# Patient Record
Sex: MALE | Race: WHITE | Employment: FULL TIME | URBAN - METROPOLITAN AREA
[De-identification: names, ages, dates, MRNs, and addresses within clinical notes are randomized per-mention and may not be internally consistent; named-entity substitution may affect disease eponyms.]

---

## 2017-05-10 RX ORDER — ATORVASTATIN CALCIUM 10 MG/1
TABLET, FILM COATED ORAL
Qty: 90 TAB | Refills: 0 | Status: SHIPPED | OUTPATIENT
Start: 2017-05-10 | End: 2017-08-08 | Stop reason: SDUPTHER

## 2017-08-08 RX ORDER — ATORVASTATIN CALCIUM 10 MG/1
TABLET, FILM COATED ORAL
Qty: 90 TAB | Refills: 0 | Status: SHIPPED | OUTPATIENT
Start: 2017-08-08 | End: 2018-07-12 | Stop reason: SDUPTHER

## 2018-07-12 ENCOUNTER — OFFICE VISIT (OUTPATIENT)
Dept: INTERNAL MEDICINE CLINIC | Age: 53
End: 2018-07-12

## 2018-07-12 VITALS
WEIGHT: 289 LBS | HEART RATE: 86 BPM | SYSTOLIC BLOOD PRESSURE: 142 MMHG | HEIGHT: 74 IN | RESPIRATION RATE: 14 BRPM | OXYGEN SATURATION: 97 % | DIASTOLIC BLOOD PRESSURE: 86 MMHG | BODY MASS INDEX: 37.09 KG/M2 | TEMPERATURE: 98.4 F

## 2018-07-12 DIAGNOSIS — J30.89 NON-SEASONAL ALLERGIC RHINITIS, UNSPECIFIED TRIGGER: ICD-10-CM

## 2018-07-12 DIAGNOSIS — Z00.00 PHYSICAL EXAM: ICD-10-CM

## 2018-07-12 DIAGNOSIS — E78.5 HYPERLIPIDEMIA, UNSPECIFIED HYPERLIPIDEMIA TYPE: ICD-10-CM

## 2018-07-12 DIAGNOSIS — R03.0 ELEVATED BLOOD-PRESSURE READING WITHOUT DIAGNOSIS OF HYPERTENSION: ICD-10-CM

## 2018-07-12 DIAGNOSIS — E66.9 OBESITY (BMI 30-39.9): ICD-10-CM

## 2018-07-12 DIAGNOSIS — G47.33 OBSTRUCTIVE SLEEP APNEA SYNDROME: ICD-10-CM

## 2018-07-12 DIAGNOSIS — G47.00 INSOMNIA, UNSPECIFIED TYPE: ICD-10-CM

## 2018-07-12 DIAGNOSIS — K57.90 DIVERTICULOSIS OF INTESTINE WITHOUT BLEEDING, UNSPECIFIED INTESTINAL TRACT LOCATION: ICD-10-CM

## 2018-07-12 DIAGNOSIS — R73.01 IFG (IMPAIRED FASTING GLUCOSE): ICD-10-CM

## 2018-07-12 DIAGNOSIS — R73.03 PREDIABETES: Primary | ICD-10-CM

## 2018-07-12 DIAGNOSIS — E66.01 SEVERE OBESITY (BMI 35.0-39.9): ICD-10-CM

## 2018-07-12 RX ORDER — FLUTICASONE PROPIONATE 50 MCG
SPRAY, SUSPENSION (ML) NASAL
Qty: 1 BOTTLE | Refills: 11 | Status: SHIPPED | OUTPATIENT
Start: 2018-07-12

## 2018-07-12 RX ORDER — ZOLPIDEM TARTRATE 10 MG/1
TABLET ORAL
Qty: 30 TAB | Refills: 5 | OUTPATIENT
Start: 2018-07-12

## 2018-07-12 RX ORDER — ATORVASTATIN CALCIUM 10 MG/1
10 TABLET, FILM COATED ORAL DAILY
Qty: 90 TAB | Refills: 1 | Status: SHIPPED | OUTPATIENT
Start: 2018-07-12 | End: 2019-01-16 | Stop reason: SDUPTHER

## 2018-07-12 NOTE — PROGRESS NOTES
1. Have you been to the ER, urgent care clinic or hospitalized since your last visit? YES. Urgent Care in May for cough    2. Have you seen or consulted any other health care providers outside of the [address] [place] [address] since your last visit (Include any pap smears or colon screening)? NO      Do you have an Advanced Directive? NO    Would you like information on Advanced Directives?  NO    Chief Complaint   Patient presents with    Physical

## 2018-07-12 NOTE — MR AVS SNAPSHOT
[facility] 
 
 
 [address], [address] [facility] 
[phone] Patient: Lexie Calvo MRN: [id number] BU:[id number] Visit Information Date & Time Provider Department Dept. Phone Encounter #  
 7/12/2018  2:00 PM Tala Uribe MD [facility] [id number] Your Appointments 1/16/2019 10:20 AM  
Office Visit with Tala Uribe MD  
[facility]) Appt Note: 6 labs at [facility] [address] [facility], [facility] [address] [place]  
  
   
 [address] [facility], [facility] Upcoming Health Maintenance Date Due Hepatitis C Screening 1965 DTaP/Tdap/Td series (1 - Tdap) 8/2/1984 FOBT Q 1 YEAR AGE 50-75 8/2/2013 Influenza Age 5 to Adult 8/1/2018 Allergies as of 7/12/2018  Review Complete On: 7/12/2018 By: Jessa Rocha LPN Severity Noted Reaction Type Reactions Flonase [Fluticasone]   Not Verified Other (comments) Patient  Says this medication is nott an allergy. Current Immunizations  Reviewed on 6/17/2014 Name Date Influenza Vaccine 12/26/2012 Not reviewed this visit You Were Diagnosed With   
  
 Codes Comments Physical exam    -  Primary ICD-10-CM: Z00.00 ICD-9-CM: V70.9 Hyperlipidemia, unspecified hyperlipidemia type     ICD-10-CM: E78.5 ICD-9-CM: 272.4 Obstructive sleep apnea syndrome     ICD-10-CM: G47.33 
ICD-9-CM: 327.23 Obesity (BMI 30-39. 9)     ICD-10-CM: E66.9 ICD-9-CM: 278.00 Diverticulosis of intestine without bleeding, unspecified intestinal tract location     ICD-10-CM: K57.90 ICD-9-CM: 562.10 Prediabetes     ICD-10-CM: R73.03 
ICD-9-CM: 790.29 Vitals BP Pulse Temp Resp Height(growth percentile) Weight(growth percentile) 148/90 86 98.4 °F (36.9 °C) (Oral) 14 6' 2\" (1.88 m) 289 lb (131.1 kg) SpO2 BMI Smoking Status 97% 37.11 kg/m2 Never Smoker Vitals History BMI and BSA Data Body Mass Index Body Surface Area  
 37.11 kg/m 2 2.62 m 2 Preferred Pharmacy Pharmacy Name Phone [id number], [facility] [facility] [phone] Your Updated Medication List  
  
   
This list is accurate as of 7/12/18  3:23 PM.  Always use your most recent med list.  
  
  
  
  
 atorvastatin 10 mg tablet Commonly known as:  LIPITOR  
TAKE 1 TABLET DAILY FISH OIL 1,000 mg Cap Generic drug:  omega-3 fatty acids-vitamin e Take 1 Cap by mouth. fluticasone 50 mcg/actuation nasal spray Commonly known as:  FLONASE  
2 sprays each nostril daily  
  
 zolpidem 10 mg tablet Commonly known as:  AMBIEN  
take 1 tablet by mouth at bedtime if needed for sleep ZyrTEC 10 mg tablet Generic drug:  cetirizine Take  by mouth daily. To-Do List   
 01/09/2019 Lab:  CBC W/O DIFF   
  
 01/09/2019 Lab:  LIPID PANEL   
  
 01/09/2019 Lab:  METABOLIC PANEL, COMPREHENSIVE   
  
 01/09/2019 Lab:  PSA, DIAGNOSTIC (PROSTATE SPECIFIC AG)   
  
 01/11/2019 Lab:  HEMOGLOBIN A1C W/O EAG Introducing [facility]! Dear Kwasi Ahmadi: 
Thank you for requesting a [employer] account. Our records indicate that you already have an active [employer] account. You can access your account anytime at [email or web address] Did you know that you can access your hospital and ER discharge instructions at any time in [employer]? You can also review all of your test results from your hospital stay or ER visit. Additional Information If you have questions, please visit the Frequently Asked Questions section of the [employer] website at [email or web address] Remember, [employer] is NOT to be used for urgent needs. For medical emergencies, dial 911. Now available from your iPhone and Android! Please provide this summary of care documentation to your next provider. Your primary care clinician is listed as Brice Ordonez. If you have any questions after today's visit, please call [phone].

## 2018-07-13 ENCOUNTER — TELEPHONE (OUTPATIENT)
Dept: INTERNAL MEDICINE CLINIC | Age: 53
End: 2018-07-13

## 2018-07-13 NOTE — TELEPHONE ENCOUNTER
The 4 scripts that were sent to [facility] yesterday need to go to the [facility] that was updated- [facility]

## 2018-07-13 NOTE — PROGRESS NOTES
45 y.o. WHITE OR  male who presents for RPE    We've not seen him in almost 3 years. He 'just got too busy with his business' and didn't have time to come in    Reports no cardiovascular complaints. He is active doing physical work although no set exercise, works from [address] to 7p or even later. He has not been following the bp     He has been having a cough since Jan.  Actually went to urgent care late Apr and reports cxr was neg then. He is not using antihistamines but the flonase helped him in the past.    Diet could be better. Reports that he eats fruit at 6a, then lunch at 2p then late dinner 7-8p.    Interested in starting back the qsymia as it helped suppress the appetite when he was taking it    Vitals 7/12/2018 12/1/2016 8/10/2015 4/22/2015 10/29/2014   Weight 289 lb 279 lb 272 lb 265 lb 244 lb 12.8 oz     No gi or gu complaints    IF 7/18    Past Medical History:   Diagnosis Date    Allergic rhinitis     Diverticulosis 2014     Dr Stiven Dejesus on US 2012     Fib-4 was 1.05 from 4/13    IOANA (generalized anxiety disorder)     Hyperlipidemia     IFG (impaired fasting glucose) 12/12    Insomnia     Obesity     peak weight 274 lbs, bmi 35.2 from 2/14; Dr Julianne Tirado 2/14; qsymia 2015; IF 7/18    Scoliosis     Seasonal allergies     Sleep apnea Dr. 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  zolpidem (AMBIEN) 10 mg tablet take 1 tablet by mouth at bedtime if needed for sleep    phentermine-topiramate (QSYMIA) 15-92 mg CM24 Take 1 Tab by mouth daily. Max Daily Amount: 1 Tab.  fluticasone (FLONASE) 50 mcg/actuation nasal spray 2 sprays each nostril daily    cetirizine (ZYRTEC) 10 mg tablet Take  by mouth daily.  omega-3 fatty acids-vitamin e (FISH OIL) 1,000 mg cap Take 1 Cap by mouth. No current facility-administered medications for this visit. No Active Allergies     REVIEW OF SYSTEMS: colo 4/14 Dr Nadine Snow no vision change or eye pain  Oral  no mouth pain, tongue or tooth problems  Ears  no hearing loss, ear pain, fullness, no swallowing problems  Cardiac  no CP, PND, orthopnea, edema, palpitations or syncope  Chest  no breast masses  GI  no heartburn, nausea, vomiting, change in bowel habits, bleeding, hemorrhoids  Urinary  no dysuria, hematuria, flank pain, urgency, frequency  Constitutional  no wt loss, night sweats, unexplained fevers  Neuro  no focal weakness, numbness, paresthesias, incoordination, ataxia, involuntary movements  Endo - no polyuria, polydipsia, nocturia, hot flashes    Visit Vitals    /86    Pulse 86    Temp 98.4 °F (36.9 °C) (Oral)    Resp 14    Ht 6' 2\" (1.88 m)    Wt 289 lb (131.1 kg)    SpO2 97%    BMI 37.11 kg/m2     Affect is appropriate.   Mood stable  No apparent distress  HEENT --Anicteric sclerae, tympanic membranes normal,  ear canals normal.  PERRL, EOMI, conjunctiva and lids normal.  Disks were sharp  Sinuses were nontender, turbinates normal, hearing normal.  Oropharynx without  erythema, normal tongue, oral mucosa and tonsils. No thyromegaly, JVD, or bruits. Lungs --Clear to auscultation, normal percussion. Heart --Regular rate and rhythm, no murmurs, rubs, gallops, or clicks. Chest wall --Nontender to palpation. PMI normal.  Abdomen -- Soft and nontender, no hepatosplenomegaly or masses. Prostate  -- no asymmetry, nodularity, tenderness or enlargement  Rectal  -- normal tone, guiaiac negative brown stool  Extremities -- Without cyanosis, clubbing, edema. 2+ pulses equally and bilaterally. LABS  From 12/11 showed gluc 104, cr 1.02, gfr 87,  alt 40,                   ldl-p 2346, chol 205, tg 185, hdl 29, ldl-c 139  From 12/12 showed gluc 95,   cr 1.03, gfr 85,  alt 59, hba1c 5.9, ldl-p 2785, chol 194, tg 165, hdl 30, ldl-c 131  From 4/13 showed   gluc 95,   cr 1.01, gfr 87,  alt 36, hba1c 6.0,                   chol 113, tg 138, hdl 24, ldl-c 61, wbc 7.7, hb 16.4, plt 187, test 466  From 2/14 showed   gluc 97,   cr 1.02, gfr 85,  alt 56, hba1c 6.1, ldl-p 1539, chol 121, tg 118, hdl 27, ldl-c 70  From 6/14 showed   gluc 90,   cr 1.00,     alt 34, hba1c 5.6, ldl-p 973,   chol 108, tg 126, hdl 26, ldl-c 71, vit d 41,                hscrp 2.2, lpa 52, uric 6. From 8/15 showed   gluc 107, cr 1.10, gfr>60, alt 49, hba1c 6.3,     chol 123, tg 113, hdl 26, ldl-c 74, wbc 7.0, hb 14.8, plt 193, ast 25, ap 56, tb 0.8    Patient Active Problem List   Diagnosis Code    Hyperlipidemia E78.5    Sleep apnea Dr. January Garcia 2013 AHI 66.3 G47.30    Diverticulosis 2014 Dr Rose Mary Sandra K57.90    Severe obesity (BMI 35.0-39.9) (HCC) E66.01    IFG (impaired fasting glucose) R73.01     Assessment and plan:  1. Dyslipidemia. Continue current regimen, check labs soon  2. IFG. Wt loss, lifestyle and dietary measures. Long discussion about typical progression of preDM. Wt loss would be ideal  3. Fatty liver. As above  4. JULIUS. F/U Dr. Sin Drivers, cont cpap  5. Diverticulosis. Fiber, colo 2024  6. Obesity.   Intermittent fasting discussed at length. Explained the concepts in detail. Went over possible physiologic changes that could occur and how that would possibly impact his situation in a positive way. Discussed 16:8 program in particular. We also went over the differences between hunger and damian hypoglycemia. Look up low insulin index foods. He will do some more research and consider implementing in the near future, standard handout given  7. Allergic rhinitis. flonase refilled, start zyrtec  8. Chronic cough. tx allergies, proceed w w/u if not improved after #7        RTC 11/18    Above conditions discussed at length and patient vocalized understanding.   All questions answered to patient satisfaction    TOTAL time 40 min spent of which at least 30 min was on counseling, answering questions and coordination of care

## 2018-07-16 ENCOUNTER — DOCUMENTATION ONLY (OUTPATIENT)
Dept: INTERNAL MEDICINE CLINIC | Age: 53
End: 2018-07-16

## 2018-07-16 NOTE — PROGRESS NOTES
PA for Qsymia denied. Insurance requires lost greater than or equal to 3 percent of baseline body weight since starting medication to be considered for continuation of therapy. Sent copy to pharmacy.

## 2019-01-16 ENCOUNTER — OFFICE VISIT (OUTPATIENT)
Dept: INTERNAL MEDICINE CLINIC | Age: 54
End: 2019-01-16

## 2019-01-16 VITALS
OXYGEN SATURATION: 98 % | TEMPERATURE: 98.7 F | SYSTOLIC BLOOD PRESSURE: 128 MMHG | RESPIRATION RATE: 14 BRPM | HEART RATE: 74 BPM | HEIGHT: 74 IN | WEIGHT: 282 LBS | BODY MASS INDEX: 36.19 KG/M2 | DIASTOLIC BLOOD PRESSURE: 82 MMHG

## 2019-01-16 DIAGNOSIS — E66.01 SEVERE OBESITY WITH BODY MASS INDEX (BMI) OF 35.0 TO 39.9 WITH SERIOUS COMORBIDITY (HCC): ICD-10-CM

## 2019-01-16 DIAGNOSIS — E78.5 HYPERLIPIDEMIA, UNSPECIFIED HYPERLIPIDEMIA TYPE: ICD-10-CM

## 2019-01-16 RX ORDER — ATORVASTATIN CALCIUM 10 MG/1
10 TABLET, FILM COATED ORAL DAILY
Qty: 90 TAB | Refills: 1 | Status: SHIPPED | OUTPATIENT
Start: 2019-01-16 | End: 2019-12-27

## 2019-01-16 NOTE — PROGRESS NOTES
52 y.o. WHITE OR  male who presents for f/u Reports no cardiovascular complaints. He is active doing physical work although no set exercise At then last visit, we introduced intermittent fasting but he has not been doing it. Alsoi, we gave him qsymia script but his pharmacist told him he could not get it anymore as 'he failed previous treatment' although he swears he's never taken it. We gave him script back in 2015 Vitals 1/16/2019 7/12/2018 12/1/2016 8/10/2015 4/22/2015 Weight 282 lb 289 lb 279 lb 272 lb 265 lb No gi or gu complaints IF 7/18 Past Medical History:  
Diagnosis Date  Allergic rhinitis  Diverticulosis 2014 Dr Tal Alanis  Fatty liver on US 2012 Fib-4 was 1.05 from 4/13  IOANA (generalized anxiety disorder)  Hyperlipidemia  IFG (impaired fasting glucose) 12/12  Insomnia  Obesity Dr Wesly Biggs 2/14; qsymia 2015; IF 7/18 start weight 289 lbs  Scoliosis  Seasonal allergies  Sleep apnea Dr. Kaitlin Rivera 2013 4/13 Dr Heaven Feliciano; AHI 66.4, min desats 70%  Urethral stricture 1995 Past Surgical History:  
Procedure Laterality Date [facility]  HX COLONOSCOPY    
 diverticulosis 4/14 Dr Tal Alanis  HX HEENT  1983  
 sinus surgery  HX REFRACTIVE SURGERY    
 HX TONSIL AND ADENOIDECTOMY  HX UROLOGICAL  2007  
 vasectomy [employer] Social History Socioeconomic History  Marital status:  Spouse name: Not on file  Number of children: 2  
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  
  Smoking status: Never Smoker  Smokeless tobacco: Never Used Substance and Sexual Activity  Alcohol use: Yes Comment: social  
 Drug use: No  
 Sexual activity: Not on file Other Topics Concern  Not on file Social History Narrative  Not on file Current Outpatient Medications Medication Sig  
 atorvastatin (LIPITOR) 10 mg tablet Take 1 Tab by mouth daily.  phentermine-topiramate (QSYMIA) 15-92 mg CM24 Take 1 Tab by mouth daily. Max Daily Amount: 1 Tab.  zolpidem (AMBIEN) 10 mg tablet take 1 tablet by mouth at bedtime if needed for sleep  cetirizine (ZYRTEC) 10 mg tablet Take  by mouth daily.  omega-3 fatty acids-vitamin e (FISH OIL) 1,000 mg cap Take 1 Cap by mouth.  fluticasone (FLONASE) 50 mcg/actuation nasal spray 2 sprays each nostril daily No current facility-administered medications for this visit. No Active Allergies REVIEW OF SYSTEMS: colo 4/14 Dr Kiko Valero Ophtho  no vision change or eye pain Oral  no mouth pain, tongue or tooth problems Ears  no hearing loss, ear pain, fullness, no swallowing problems Cardiac  no CP, PND, orthopnea, edema, palpitations or syncope Chest  no breast masses GI  no heartburn, nausea, vomiting, change in bowel habits, bleeding, hemorrhoids Urinary  no dysuria, hematuria, flank pain, urgency, frequency Visit Vitals /82 Pulse 74 Temp 98.7 °F (37.1 °C) (Oral) Resp 14 Ht 6' 2\" (1.88 m) Wt 282 lb (127.9 kg) SpO2 98% BMI 36.21 kg/m² A&O x3 Affect is appropriate. Mood stable No apparent distress Anicteric, no JVD, adenopathy or thyromegaly. No carotid bruits or radiated murmur Lungs clear to auscultation, no wheezes or rales Heart showed regular rate and rhythm. No murmur, rubs, gallops Abdomen soft nontender, no hepatosplenomegaly or masses. Extremities without edema. Pulses 1-2+ symmetrically LABS From 12/11 showed gluc 104, cr 1.02, gfr 87,  alt 40, ldl-p 2346, chol 205, tg 185, hdl 29, ldl-c 139 From 12/12 showed gluc 95,   cr 1.03, gfr 85,  alt 59, hba1c 5.9, ldl-p 2785, chol 194, tg 165, hdl 30, ldl-c 131 From 4/13 showed   gluc 95,   cr 1.01, gfr 87,  alt 36, hba1c 6.0,                   chol 113, tg 138, hdl 24, ldl-c 61, wbc 7.7, hb 16.4, plt 187, test 466 From 2/14 showed   gluc 97,   cr 1.02, gfr 85,  alt 56, hba1c 6.1, ldl-p 1539, chol 121, tg 118, hdl 27, ldl-c 70 From 6/14 showed   gluc 90,   cr 1.00,     alt 34, hba1c 5.6, ldl-p 973,   chol 108, tg 126, hdl 26, ldl-c 71, vit d 41,                hscrp 2.2, lpa 52, uric 6. From 8/15 showed   gluc 107, cr 1.10, gfr>60, alt 49, hba1c 6.3,     chol 123, tg 113, hdl 26, ldl-c 74, wbc 7.0, hb 14.8, plt 193, ast 25, ap 56, tb 0.8 Patient Active Problem List  
Diagnosis Code  Hyperlipidemia E78.5  Sleep apnea Dr. Tanmay Merlos 2013 AHI 66.3 G47.30  Diverticulosis 2014 Dr Darrel Jenkins K57.90  Severe obesity (BMI 35.0-39. 9) E66.01  
 IFG (impaired fasting glucose) R73.01 Assessment and plan: 1. Dyslipidemia. Continue current regimen, check labs soon 2. IFG. Wt loss, lifestyle and dietary measures. 3. Fatty liver. As above 4. JULIUS. F/U Dr. Tanmay Merlos, cont cpap 5. Diverticulosis. Fiber, colo 2024 6. Obesity. He will try IF again and we went over the specific details of the scheduling 7. Allergic rhinitis. Continue current regimen. RTC 7/19 Above conditions discussed at length and patient vocalized understanding. All questions answered to patient satisfaction

## 2019-01-16 NOTE — PROGRESS NOTES
1. Have you been to the ER, urgent care clinic or hospitalized since your last visit? NO.  
 
2. Have you seen or consulted any other health care providers outside of the [address] [place] [address] since your last visit (Include any pap smears or colon screening)? NO Do you have an Advanced Directive? NO Would you like information on Advanced Directives? NO Chief Complaint Patient presents with  Cholesterol Problem 6 month follow up

## 2019-04-23 ENCOUNTER — HOSPITAL ENCOUNTER (OUTPATIENT)
Dept: LAB | Age: 54
Discharge: HOME | End: 2019-04-23
Payer: OTHER GOVERNMENT

## 2019-04-23 DIAGNOSIS — E78.5 HYPERLIPIDEMIA, UNSPECIFIED HYPERLIPIDEMIA TYPE: ICD-10-CM

## 2019-04-23 DIAGNOSIS — E66.01 SEVERE OBESITY WITH BODY MASS INDEX (BMI) OF 35.0 TO 39.9 WITH SERIOUS COMORBIDITY (HCC): ICD-10-CM

## 2019-04-23 DIAGNOSIS — Z00.00 PHYSICAL EXAM: ICD-10-CM

## 2019-04-23 LAB
ALBUMIN SERPL-MCNC: 4 G/DL (ref 3.4–5)
ALBUMIN/GLOB SERPL: 1.4 {RATIO} (ref 0.8–1.7)
ALP SERPL-CCNC: 80 U/L (ref 45–117)
ALT SERPL-CCNC: 59 U/L (ref 16–61)
ANION GAP SERPL CALC-SCNC: 5 MMOL/L (ref 3–18)
AST SERPL-CCNC: 26 U/L (ref 15–37)
BILIRUB SERPL-MCNC: 0.7 MG/DL (ref 0.2–1)
BUN SERPL-MCNC: 14 MG/DL (ref 7–18)
BUN/CREAT SERPL: 13 (ref 12–20)
CALCIUM SERPL-MCNC: 9.2 MG/DL (ref 8.5–10.1)
CHLORIDE SERPL-SCNC: 109 MMOL/L (ref 100–108)
CHOLEST SERPL-MCNC: 139 MG/DL
CO2 SERPL-SCNC: 29 MMOL/L (ref 21–32)
CREAT SERPL-MCNC: 1.04 MG/DL (ref 0.6–1.3)
ERYTHROCYTE [DISTWIDTH] IN BLOOD BY AUTOMATED COUNT: 14.7 % (ref 11.6–14.5)
GLOBULIN SER CALC-MCNC: 2.8 G/DL (ref 2–4)
GLUCOSE SERPL-MCNC: 99 MG/DL (ref 74–99)
HCT VFR BLD AUTO: 47 % (ref 36–48)
HDLC SERPL-MCNC: 24 MG/DL (ref 40–60)
HDLC SERPL: 5.8 {RATIO} (ref 0–5)
HGB BLD-MCNC: 15.9 G/DL (ref 13–16)
LDLC SERPL CALC-MCNC: 80.6 MG/DL (ref 0–100)
LIPID PROFILE,FLP: ABNORMAL
MCH RBC QN AUTO: 28.6 PG (ref 24–34)
MCHC RBC AUTO-ENTMCNC: 33.8 G/DL (ref 31–37)
MCV RBC AUTO: 84.7 FL (ref 74–97)
PLATELET # BLD AUTO: 185 K/UL (ref 135–420)
PMV BLD AUTO: 10 FL (ref 9.2–11.8)
POTASSIUM SERPL-SCNC: 4.4 MMOL/L (ref 3.5–5.5)
PROT SERPL-MCNC: 6.8 G/DL (ref 6.4–8.2)
PSA SERPL-MCNC: 0.6 NG/ML (ref 0–4)
RBC # BLD AUTO: 5.55 M/UL (ref 4.7–5.5)
SODIUM SERPL-SCNC: 143 MMOL/L (ref 136–145)
T4 FREE SERPL-MCNC: 0.9 NG/DL (ref 0.7–1.5)
TRIGL SERPL-MCNC: 172 MG/DL (ref ?–150)
TSH SERPL DL<=0.05 MIU/L-ACNC: 1.58 UIU/ML (ref 0.36–3.74)
VLDLC SERPL CALC-MCNC: 34.4 MG/DL
WBC # BLD AUTO: 6.8 K/UL (ref 4.6–13.2)

## 2019-04-23 PROCEDURE — 85027 COMPLETE CBC AUTOMATED: CPT

## 2019-04-23 PROCEDURE — 36415 COLL VENOUS BLD VENIPUNCTURE: CPT

## 2019-04-23 PROCEDURE — 80053 COMPREHEN METABOLIC PANEL: CPT

## 2019-04-23 PROCEDURE — 84153 ASSAY OF PSA TOTAL: CPT

## 2019-04-23 PROCEDURE — 84443 ASSAY THYROID STIM HORMONE: CPT

## 2019-04-23 PROCEDURE — 84439 ASSAY OF FREE THYROXINE: CPT

## 2019-04-23 PROCEDURE — 80061 LIPID PANEL: CPT

## 2019-04-25 NOTE — PROGRESS NOTES
52 y.o. WHITE OR  male who presents for f/u    Reports no cardiovascular complaints.   He is active doing physical work although no set exercise    He's doing IF averaging one meal a day at dinner, also taking qsymia which is suppressing the appetitie some, wt loss as below    Vitals 4/29/2019 1/16/2019 7/12/2018 12/1/2016 8/10/2015   Weight 268 lb 282 lb 289 lb 279 lb 272 lb     No gi or gu complaints    IF 7/18    Past Medical History:   Diagnosis Date    Allergic rhinitis     Diverticulosis 2014     Dr Verito Gallardo on US 2012     Fib-4 was 1.05 from 4/13    IOANA (generalized anxiety disorder)     Hyperlipidemia     IFG (impaired fasting glucose) 12/12    Insomnia     Obesity     Dr Jamin Martin 2/14; qsymia 2015; IF 7/18 start weight 289 lbs    Scoliosis     Seasonal allergies     Sleep apnea Dr. 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[employer], owns a [employer] business     Employer: [employer]   Social Needs    Financial resource strain: Not on file    Food insecurity:     Worry: Not on file     Inability: Not on file    Transportation needs:     Medical: Not on file     Non-medical: Not on file   Tobacco Use    Smoking status: Never Smoker    Smokeless tobacco: Never Used   Substance and Sexual Activity    Alcohol use: Yes     Comment: social    Drug use: No    Sexual activity: Not on file   Lifestyle    Physical activity:     Days per week: Not on file     Minutes per session: Not on file    Stress: Not on file   Relationships    Social connections:     Talks on phone: Not on file     Gets together: Not on file     Attends Orthodoxy service: Not on file     Active member of club or organization: Not on file     Attends meetings of clubs or organizations: Not on file     Relationship status: Not on file    Intimate partner violence:     Fear of current or ex partner: Not on file     Emotionally abused: Not on file     Physically abused: Not on file     Forced sexual activity: Not on file   Other Topics Concern    Not on file   Social History Narrative    Not on file     Current Outpatient Medications   Medication Sig    atorvastatin (LIPITOR) 10 mg tablet Take 1 Tab by mouth daily.  phentermine-topiramate (QSYMIA) 15-92 mg CM24 Take 1 Tab by mouth daily. Max Daily Amount: 1 Tab.  zolpidem (AMBIEN) 10 mg tablet take 1 tablet by mouth at bedtime if needed for sleep    fluticasone (FLONASE) 50 mcg/actuation nasal spray 2 sprays each nostril daily (Patient taking differently: as needed. 2 sprays each nostril daily)    cetirizine (ZYRTEC) 10 mg tablet Take  by mouth daily. No current facility-administered medications for this visit. No Active Allergies     REVIEW OF SYSTEMS: colo 4/14 Dr Rincon Floor - no vision change or eye pain  Oral - no mouth pain, tongue or tooth problems  Ears - no hearing loss, ear pain, fullness, no swallowing problems  Cardiac - no CP, PND, orthopnea, edema, palpitations or syncope  Chest - no breast masses  GI - no heartburn, nausea, vomiting, change in bowel habits, bleeding, hemorrhoids  Urinary - no dysuria, hematuria, flank pain, urgency, frequency    Visit Vitals  /85   Pulse 78   Temp 98 °F (36.7 °C) (Oral)   Resp 14   Ht 6' 2\" (1.88 m)   Wt 268 lb (121.6 kg)   SpO2 97%   BMI 34.41 kg/m²   A&O x3  Affect is appropriate.   Mood stable  No apparent distress  Anicteric, no JVD, adenopathy or thyromegaly. No carotid bruits or radiated murmur  Lungs clear to auscultation, no wheezes or rales  Heart showed regular rate and rhythm. No murmur, rubs, gallops  Abdomen soft nontender, no hepatosplenomegaly or masses. Extremities without edema. Pulses 1-2+ symmetrically    LABS  From 12/11 showed gluc 104, cr 1.02, gfr 87,  alt 40,                   ldl-p 2346, chol 205, tg 185, hdl 29, ldl-c 139  From 12/12 showed gluc 95,   cr 1.03, gfr 85,  alt 59, hba1c 5.9, ldl-p 2785, chol 194, tg 165, hdl 30, ldl-c 131  From 4/13 showed   gluc 95,   cr 1.01, gfr 87,  alt 36, hba1c 6.0,                   chol 113, tg 138, hdl 24, ldl-c 61, wbc 7.7, hb 16.4, plt 187, test 466  From 2/14 showed   gluc 97,   cr 1.02, gfr 85,  alt 56, hba1c 6.1, ldl-p 1539, chol 121, tg 118, hdl 27, ldl-c 70  From 6/14 showed   gluc 90,   cr 1.00,     alt 34, hba1c 5.6, ldl-p 973,   chol 108, tg 126, hdl 26, ldl-c 71, vit d 41,                hscrp 2.2, lpa 52, uric 6.   From 8/15 showed   gluc 107, cr 1.10, gfr>60, alt 49, hba1c 6.3,     chol 123, tg 113, hdl 26, ldl-c 74, wbc 7.0, hb 14.8, plt 193, ast 25, ap 56, tb 0.8    Results for orders placed or performed during the hospital encounter of 04/23/19   LIPID PANEL   Result Value Ref Range    LIPID PROFILE          Cholesterol, total 139 <200 MG/DL    Triglyceride 172 (H) <150 MG/DL    HDL Cholesterol 24 (L) 40 - 60 MG/DL    LDL, calculated 80.6 0 - 100 MG/DL    VLDL, calculated 34.4 MG/DL    CHOL/HDL Ratio 5.8 (H) 0 - 5.0     METABOLIC PANEL, COMPREHENSIVE   Result Value Ref Range    Sodium 143 136 - 145 mmol/L    Potassium 4.4 3.5 - 5.5 mmol/L    Chloride 109 (H) 100 - 108 mmol/L    CO2 29 21 - 32 mmol/L    Anion gap 5 3.0 - 18 mmol/L    Glucose 99 74 - 99 mg/dL    BUN 14 7.0 - 18 MG/DL    Creatinine 1.04 0.6 - 1.3 MG/DL    BUN/Creatinine ratio 13 12 - 20      GFR est AA >60 >60 ml/min/1.73m2    GFR est non-AA >60 >60 ml/min/1.73m2    Calcium 9.2 8.5 - 10.1 MG/DL Bilirubin, total 0.7 0.2 - 1.0 MG/DL    ALT (SGPT) 59 16 - 61 U/L    AST (SGOT) 26 15 - 37 U/L    Alk. phosphatase 80 45 - 117 U/L    Protein, total 6.8 6.4 - 8.2 g/dL    Albumin 4.0 3.4 - 5.0 g/dL    Globulin 2.8 2.0 - 4.0 g/dL    A-G Ratio 1.4 0.8 - 1.7     PSA, DIAGNOSTIC (PROSTATE SPECIFIC AG)   Result Value Ref Range    Prostate Specific Ag 0.6 0.0 - 4.0 ng/mL   T4, FREE   Result Value Ref Range    T4, Free 0.9 0.7 - 1.5 NG/DL   TSH 3RD GENERATION   Result Value Ref Range    TSH 1.58 0.36 - 3.74 uIU/mL   CBC W/O DIFF   Result Value Ref Range    WBC 6.8 4.6 - 13.2 K/uL    RBC 5.55 (H) 4.70 - 5.50 M/uL    HGB 15.9 13.0 - 16.0 g/dL    HCT 47.0 36.0 - 48.0 %    MCV 84.7 74.0 - 97.0 FL    MCH 28.6 24.0 - 34.0 PG    MCHC 33.8 31.0 - 37.0 g/dL    RDW 14.7 (H) 11.6 - 14.5 %    PLATELET 861 806 - 013 K/uL    MPV 10.0 9.2 - 11.8 FL     Patient Active Problem List   Diagnosis Code    Hyperlipidemia E78.5    Sleep apnea Dr. Tom Amado 2013 AHI 66.3 G47.30    Diverticulosis 2014 Dr Bertha Bartholomew K57.90    Severe obesity (BMI 35.0-39. 9) E66.01    IFG (impaired fasting glucose) R73.01     Assessment and plan:  1. Dyslipidemia. Continue current regimen, check labs soon  2. IFG. Wt loss, lifestyle and dietary measures. 3. Fatty liver. As above  4. JULIUS. F/U Dr. Tom Amado, cont cpap  5. Diverticulosis. Fiber, colo 2024  6. Obesity. He will try IF again and we went over the specific details of the scheduling  7. Allergic rhinitis. Continue current regimen. RTC 7/19    Above conditions discussed at length and patient vocalized understanding.   All questions answered to patient satisfaction

## 2019-04-29 ENCOUNTER — OFFICE VISIT (OUTPATIENT)
Dept: INTERNAL MEDICINE CLINIC | Age: 54
End: 2019-04-29

## 2019-04-29 VITALS
SYSTOLIC BLOOD PRESSURE: 124 MMHG | RESPIRATION RATE: 14 BRPM | WEIGHT: 268 LBS | TEMPERATURE: 98 F | HEART RATE: 78 BPM | HEIGHT: 74 IN | BODY MASS INDEX: 34.39 KG/M2 | OXYGEN SATURATION: 97 % | DIASTOLIC BLOOD PRESSURE: 85 MMHG

## 2019-04-29 DIAGNOSIS — E78.5 HYPERLIPIDEMIA, UNSPECIFIED HYPERLIPIDEMIA TYPE: ICD-10-CM

## 2019-04-29 DIAGNOSIS — G47.33 OBSTRUCTIVE SLEEP APNEA SYNDROME: Primary | ICD-10-CM

## 2019-04-29 DIAGNOSIS — K57.90 DIVERTICULOSIS: ICD-10-CM

## 2019-04-29 DIAGNOSIS — E66.01 SEVERE OBESITY WITH BODY MASS INDEX (BMI) OF 35.0 TO 39.9 WITH SERIOUS COMORBIDITY (HCC): ICD-10-CM

## 2019-04-29 DIAGNOSIS — R73.01 IFG (IMPAIRED FASTING GLUCOSE): ICD-10-CM

## 2019-04-29 NOTE — PROGRESS NOTES
Arthur Fall presents today for   Chief Complaint   Patient presents with    Cholesterol Problem     3 month follow up with labs              Depression Screening:  3 most recent PHQ Screens 1/16/2019   Little interest or pleasure in doing things Not at all   Feeling down, depressed, irritable, or hopeless Not at all   Total Score PHQ 2 0       Learning Assessment:  Learning Assessment 3/25/2014   PRIMARY LEARNER Patient   HIGHEST LEVEL OF EDUCATION - PRIMARY LEARNER  -   BARRIERS PRIMARY LEARNER NONE   CO-LEARNER CAREGIVER -   PRIMARY LANGUAGE ENGLISH   LEARNER PREFERENCE PRIMARY DEMONSTRATION   ANSWERED BY -   RELATIONSHIP SELF       Abuse Screening:  Abuse Screening Questionnaire 7/12/2018   Do you ever feel afraid of your partner? N   Are you in a relationship with someone who physically or mentally threatens you? N   Is it safe for you to go home? Y       Fall Risk  Fall Risk Assessment, last 12 mths 3/25/2014   Able to walk? Yes   Fall in past 12 months? No           Coordination of Care:  1. Have you been to the ER, urgent care clinic since your last visit? Hospitalized since your last visit? NO    2. Have you seen or consulted any other health care providers outside of the [facility] since your last visit? Include any pap smears or colon screening.  NO

## 2019-08-19 ENCOUNTER — TELEPHONE (OUTPATIENT)
Dept: INTERNAL MEDICINE CLINIC | Age: 54
End: 2019-08-19

## 2019-08-19 DIAGNOSIS — E66.01 SEVERE OBESITY WITH BODY MASS INDEX (BMI) OF 35.0 TO 39.9 WITH SERIOUS COMORBIDITY (HCC): ICD-10-CM

## 2019-08-19 RX ORDER — PHENTERMINE AND TOPIRAMATE 15; 92 MG/1; MG/1
CAPSULE, EXTENDED RELEASE ORAL
Qty: 30 CAP | Refills: 5 | Status: SHIPPED | OUTPATIENT
Start: 2019-08-19

## 2019-12-26 DIAGNOSIS — E78.5 HYPERLIPIDEMIA, UNSPECIFIED HYPERLIPIDEMIA TYPE: ICD-10-CM

## 2019-12-27 RX ORDER — ATORVASTATIN CALCIUM 10 MG/1
TABLET, FILM COATED ORAL
Qty: 90 TAB | Refills: 1 | Status: SHIPPED | OUTPATIENT
Start: 2019-12-27